# Patient Record
Sex: MALE | URBAN - METROPOLITAN AREA
[De-identification: names, ages, dates, MRNs, and addresses within clinical notes are randomized per-mention and may not be internally consistent; named-entity substitution may affect disease eponyms.]

---

## 2021-12-14 ENCOUNTER — OFFICE VISIT (OUTPATIENT)
Dept: URBAN - METROPOLITAN AREA CLINIC 98 | Facility: CLINIC | Age: 41
End: 2021-12-14
Payer: SELF-PAY

## 2021-12-14 DIAGNOSIS — R13.14 PHARYNGOESOPHAGEAL DYSPHAGIA: ICD-10-CM

## 2021-12-14 DIAGNOSIS — K21.9 GERD WITHOUT ESOPHAGITIS: ICD-10-CM

## 2021-12-14 DIAGNOSIS — E04.9 GOITER: ICD-10-CM

## 2021-12-14 PROBLEM — 40739000 DYSPHAGIA: Status: ACTIVE | Noted: 2021-12-14

## 2021-12-14 PROCEDURE — 99243 OFF/OP CNSLTJ NEW/EST LOW 30: CPT | Performed by: INTERNAL MEDICINE

## 2021-12-14 RX ORDER — PANTOPRAZOLE SODIUM 40 MG/1
1 PACKET MIXED WITH APPLE JUICE OR APPLESAUCE GRANULE, DELAYED RELEASE ORAL ONCE A DAY
Qty: 20 | Refills: 1 | OUTPATIENT
Start: 2021-12-14

## 2021-12-14 NOTE — HPI-TODAY'S VISIT:
Patient referred by Oliver Hackett MD for evaluation of chronic GERD and intermittent dysphagia wo food impaction. Copy of this consult OV sent to Dr. Hackett. 42 yo pt w several weeks of intermittent dysphagia to solids wo food impaction w nocturnal APRIL sxs. Has been on a bland diet and has lost some weight. NO cardiorespiratory sxs. Has noted intermittent rectal bleeding wo melenic stools. He has  no constitutional sxs.  He was seen  at [facility]  [facility] and Dx'd w tonsilliitis: Prednisone / Amoxicillin. No improvement and seen few days later at [facility] ER: Dx' w GERD and treated w Pantoprazole. Seen by ENT: LPR w normal rhinoscopy. Had mild COVID-19 and has received 2 doses of COVID-19 vaccine. No other complaints.

## 2021-12-28 ENCOUNTER — TELEPHONE ENCOUNTER (OUTPATIENT)
Dept: URBAN - METROPOLITAN AREA CLINIC 98 | Facility: CLINIC | Age: 41
End: 2021-12-28

## 2021-12-28 ENCOUNTER — OFFICE VISIT (OUTPATIENT)
Dept: URBAN - METROPOLITAN AREA CLINIC 97 | Facility: CLINIC | Age: 41
End: 2021-12-28

## 2021-12-28 RX ORDER — PANTOPRAZOLE SODIUM 40 MG/1
1 TABLET TABLET, DELAYED RELEASE ORAL ONCE A DAY
Qty: 30 TABLET | Refills: 1
Start: 2021-12-14

## 2022-01-04 ENCOUNTER — TELEPHONE ENCOUNTER (OUTPATIENT)
Dept: URBAN - METROPOLITAN AREA CLINIC 98 | Facility: CLINIC | Age: 42
End: 2022-01-04

## 2022-01-04 RX ORDER — PANTOPRAZOLE SODIUM 40 MG/1
1 PACKET MIXED WITH APPLE JUICE OR APPLESAUCE GRANULE, DELAYED RELEASE ORAL ONCE A DAY
Qty: 90 | Refills: 3 | OUTPATIENT
Start: 2022-01-05

## 2022-01-05 PROBLEM — 40739000: Status: ACTIVE | Noted: 2021-12-14

## 2022-01-11 ENCOUNTER — TELEPHONE ENCOUNTER (OUTPATIENT)
Dept: URBAN - METROPOLITAN AREA SURGERY CENTER 30 | Facility: SURGERY CENTER | Age: 42
End: 2022-01-11

## 2022-01-11 ENCOUNTER — OFFICE VISIT (OUTPATIENT)
Dept: URBAN - METROPOLITAN AREA CLINIC 97 | Facility: CLINIC | Age: 42
End: 2022-01-11

## 2022-01-20 ENCOUNTER — OFFICE VISIT (OUTPATIENT)
Dept: URBAN - METROPOLITAN AREA SURGERY CENTER 18 | Facility: SURGERY CENTER | Age: 42
End: 2022-01-20
Payer: SELF-PAY

## 2022-01-20 ENCOUNTER — CLAIMS CREATED FROM THE CLAIM WINDOW (OUTPATIENT)
Dept: URBAN - METROPOLITAN AREA CLINIC 4 | Facility: CLINIC | Age: 42
End: 2022-01-20
Payer: SELF-PAY

## 2022-01-20 DIAGNOSIS — K29.60 ADENOPAPILLOMATOSIS GASTRICA: ICD-10-CM

## 2022-01-20 DIAGNOSIS — K29.60 OTHER GASTRITIS WITHOUT BLEEDING: ICD-10-CM

## 2022-01-20 DIAGNOSIS — K21.9 GASTRO-ESOPHAGEAL REFLUX DISEASE WITHOUT ESOPHAGITIS: ICD-10-CM

## 2022-01-20 DIAGNOSIS — K31.89 DUODENAL ERYTHEMA: ICD-10-CM

## 2022-01-20 DIAGNOSIS — B96.81 HELICOBACTER PYLORI [H. PYLORI] AS THE CAUSE OF DISEASES CLASSIFIED ELSEWHERE: ICD-10-CM

## 2022-01-20 DIAGNOSIS — B96.81 BACTERIAL INFECTION DUE TO H. PYLORI: ICD-10-CM

## 2022-01-20 DIAGNOSIS — K21.9 ACID REFLUX: ICD-10-CM

## 2022-01-20 PROCEDURE — 43239 EGD BIOPSY SINGLE/MULTIPLE: CPT | Performed by: INTERNAL MEDICINE

## 2022-01-20 PROCEDURE — G8907 PT DOC NO EVENTS ON DISCHARG: HCPCS | Performed by: INTERNAL MEDICINE

## 2022-01-20 PROCEDURE — 88305 TISSUE EXAM BY PATHOLOGIST: CPT | Performed by: PATHOLOGY

## 2022-01-20 PROCEDURE — 88312 SPECIAL STAINS GROUP 1: CPT | Performed by: PATHOLOGY

## 2022-01-20 RX ORDER — PANTOPRAZOLE SODIUM 40 MG/1
1 PACKET MIXED WITH APPLE JUICE OR APPLESAUCE GRANULE, DELAYED RELEASE ORAL ONCE A DAY
Qty: 90 | Refills: 3 | Status: ACTIVE | COMMUNITY
Start: 2022-01-05

## 2022-01-20 RX ORDER — PANTOPRAZOLE SODIUM 40 MG/1
1 TABLET TABLET, DELAYED RELEASE ORAL ONCE A DAY
Qty: 30 TABLET | Refills: 1 | Status: ACTIVE | COMMUNITY
Start: 2021-12-14

## 2022-01-24 ENCOUNTER — TELEPHONE ENCOUNTER (OUTPATIENT)
Dept: URBAN - METROPOLITAN AREA CLINIC 92 | Facility: CLINIC | Age: 42
End: 2022-01-24

## 2022-01-24 RX ORDER — AMOXICILLIN 500 MG/1
2 CAPSULES CAPSULE ORAL
Qty: 56 CAPSULE | Refills: 0 | OUTPATIENT
Start: 2022-01-24 | End: 2022-02-07

## 2022-01-24 RX ORDER — OMEPRAZOLE 20 MG/1
1 CAPSULE 30 MINUTES BEFORE MORNING MEAL CAPSULE, DELAYED RELEASE ORAL TWICE A DAY
Qty: 28 | Refills: 0 | OUTPATIENT
Start: 2022-01-24

## 2022-01-24 RX ORDER — METRONIDAZOLE 500 MG/1
1 TABLET TABLET ORAL TWICE A DAY
Qty: 28 TABLET | Refills: 0 | OUTPATIENT
Start: 2022-01-24 | End: 2022-02-07

## 2022-01-24 RX ORDER — CLARITHROMYCIN 500 MG/1
1 TABLET TABLET, FILM COATED ORAL
Qty: 28 TABLET | Refills: 0 | OUTPATIENT
Start: 2022-01-24 | End: 2022-02-07

## 2022-02-01 ENCOUNTER — TELEPHONE ENCOUNTER (OUTPATIENT)
Dept: URBAN - METROPOLITAN AREA CLINIC 98 | Facility: CLINIC | Age: 42
End: 2022-02-01

## 2022-02-02 ENCOUNTER — TELEPHONE ENCOUNTER (OUTPATIENT)
Dept: URBAN - METROPOLITAN AREA CLINIC 98 | Facility: CLINIC | Age: 42
End: 2022-02-02

## 2022-03-02 ENCOUNTER — TELEPHONE ENCOUNTER (OUTPATIENT)
Dept: URBAN - METROPOLITAN AREA CLINIC 98 | Facility: CLINIC | Age: 42
End: 2022-03-02

## 2022-03-15 ENCOUNTER — OFFICE VISIT (OUTPATIENT)
Dept: URBAN - METROPOLITAN AREA CLINIC 98 | Facility: CLINIC | Age: 42
End: 2022-03-15
Payer: SELF-PAY

## 2022-03-15 ENCOUNTER — LAB OUTSIDE AN ENCOUNTER (OUTPATIENT)
Dept: URBAN - METROPOLITAN AREA CLINIC 98 | Facility: CLINIC | Age: 42
End: 2022-03-15

## 2022-03-15 VITALS
TEMPERATURE: 97.2 F | DIASTOLIC BLOOD PRESSURE: 71 MMHG | BODY MASS INDEX: 32.4 KG/M2 | HEART RATE: 72 BPM | WEIGHT: 189.8 LBS | HEIGHT: 64 IN | SYSTOLIC BLOOD PRESSURE: 112 MMHG

## 2022-03-15 DIAGNOSIS — R13.12 OROPHARYNGEAL DYSPHAGIA: ICD-10-CM

## 2022-03-15 DIAGNOSIS — K29.70 GASTRITIS, UNSPECIFIED, WITHOUT BLEEDING: ICD-10-CM

## 2022-03-15 DIAGNOSIS — B96.81 HELICOBACTER PYLORI [H. PYLORI] AS THE CAUSE OF DISEASES CLASSIFIED ELSEWHERE: ICD-10-CM

## 2022-03-15 DIAGNOSIS — E66.9 OBESITY (BMI 30.0-34.9): ICD-10-CM

## 2022-03-15 DIAGNOSIS — E04.9 GOITER: ICD-10-CM

## 2022-03-15 DIAGNOSIS — K21.9 GERD WITHOUT ESOPHAGITIS: ICD-10-CM

## 2022-03-15 PROCEDURE — 83014 H PYLORI DRUG ADMIN: CPT | Performed by: INTERNAL MEDICINE

## 2022-03-15 PROCEDURE — 99214 OFFICE O/P EST MOD 30 MIN: CPT | Performed by: INTERNAL MEDICINE

## 2022-03-15 PROCEDURE — 83013 H PYLORI (C-13) BREATH: CPT | Performed by: INTERNAL MEDICINE

## 2022-03-15 RX ORDER — PANTOPRAZOLE SODIUM 40 MG/1
1 PACKET MIXED WITH APPLE JUICE OR APPLESAUCE GRANULE, DELAYED RELEASE ORAL ONCE A DAY
Qty: 90 | Refills: 3 | Status: ON HOLD | COMMUNITY
Start: 2022-01-05

## 2022-03-15 RX ORDER — PANTOPRAZOLE SODIUM 40 MG/1
1 TABLET TABLET, DELAYED RELEASE ORAL ONCE A DAY
Qty: 30 TABLET | Refills: 1 | Status: ON HOLD | COMMUNITY
Start: 2021-12-14

## 2022-03-15 RX ORDER — OMEPRAZOLE 20 MG/1
1 CAPSULE 30 MINUTES BEFORE MORNING MEAL CAPSULE, DELAYED RELEASE ORAL TWICE A DAY
Qty: 28 | Refills: 0 | Status: ON HOLD | COMMUNITY
Start: 2022-01-24

## 2022-03-15 NOTE — HPI-TODAY'S VISIT:
42 yo pt w several weeks of intermittent dysphagia to solids wo food impaction w nocturnal APRIL sxs. Has been on a bland diet and has lost some weight. NO cardiorespiratory sxs. EGD 1/22: GERD wo BE, sm HH, Hp + chrojic active gastritis wo dysplasia and normal duodenal bx's.  He finished [facility] a mo ago, and currently on no PPI. Today, he still c/o oropharyngeal dysphagia that occurs  intermittently: difficulty in initiating a swallow at times, some nasopharyngeal regurgitation and sensation of residual food remaining in the pharynx. Has been on a bland diet. Thyroid US: goiter wo nodules; seen by Endocrine and Dx'd w Hashimoto's on Levothyroxine for past 2 mos. Recent Ba Swallow: spontaneous APRIL to the upper thoracic esophagus.He has  no constitutional sxs.   Seen by ENT: LPR w normal rhinoscopy. Had mild COVID-19 and has received 2 doses of COVID-19 vaccine. No other complaints.

## 2022-03-17 LAB
H PYLORI BREATH TEST: NEGATIVE
H. PYLORI BREATH COLLECTION: (no result)

## 2022-04-18 ENCOUNTER — TELEPHONE ENCOUNTER (OUTPATIENT)
Dept: URBAN - METROPOLITAN AREA CLINIC 98 | Facility: CLINIC | Age: 42
End: 2022-04-18

## 2022-04-18 RX ORDER — PANTOPRAZOLE SODIUM 40 MG/1
1 TABLET TABLET, DELAYED RELEASE ORAL ONCE A DAY
Qty: 30 | Refills: 3 | OUTPATIENT
Start: 2022-04-18

## 2022-05-12 ENCOUNTER — ERX REFILL RESPONSE (OUTPATIENT)
Dept: URBAN - METROPOLITAN AREA CLINIC 98 | Facility: CLINIC | Age: 42
End: 2022-05-12

## 2022-05-12 RX ORDER — PANTOPRAZOLE SODIUM 40 MG/1
1 TABLET TABLET, DELAYED RELEASE ORAL ONCE A DAY
Qty: 30 | Refills: 3 | OUTPATIENT

## 2022-05-12 RX ORDER — PANTOPRAZOLE SODIUM 40 MG/1
TAKE 1 TABLET BY MOUTH EVERY DAY FOR 30 DAYS TABLET, DELAYED RELEASE ORAL
Qty: 30 TABLET | Refills: 4 | OUTPATIENT

## 2022-06-07 ENCOUNTER — OFFICE VISIT (OUTPATIENT)
Dept: URBAN - METROPOLITAN AREA MEDICAL CENTER 28 | Facility: MEDICAL CENTER | Age: 42
End: 2022-06-07

## 2022-06-07 RX ORDER — PANTOPRAZOLE SODIUM 40 MG/1
1 PACKET MIXED WITH APPLE JUICE OR APPLESAUCE GRANULE, DELAYED RELEASE ORAL ONCE A DAY
Qty: 90 | Refills: 3 | Status: ON HOLD | COMMUNITY
Start: 2022-01-05

## 2022-06-07 RX ORDER — OMEPRAZOLE 20 MG/1
1 CAPSULE 30 MINUTES BEFORE MORNING MEAL CAPSULE, DELAYED RELEASE ORAL TWICE A DAY
Qty: 28 | Refills: 0 | Status: ON HOLD | COMMUNITY
Start: 2022-01-24

## 2022-06-07 RX ORDER — PANTOPRAZOLE SODIUM 40 MG/1
TAKE 1 TABLET BY MOUTH EVERY DAY FOR 30 DAYS TABLET, DELAYED RELEASE ORAL
Qty: 30 TABLET | Refills: 4 | Status: ACTIVE | COMMUNITY

## 2022-08-19 ENCOUNTER — ERX REFILL RESPONSE (OUTPATIENT)
Dept: URBAN - METROPOLITAN AREA CLINIC 98 | Facility: CLINIC | Age: 42
End: 2022-08-19

## 2022-08-19 RX ORDER — PANTOPRAZOLE SODIUM 40 MG/1
TAKE 1 TABLET BY MOUTH EVERY DAY TABLET, DELAYED RELEASE ORAL
Qty: 90 TABLET | Refills: 2 | OUTPATIENT

## 2022-08-19 RX ORDER — PANTOPRAZOLE SODIUM 40 MG/1
TAKE 1 TABLET BY MOUTH EVERY DAY TABLET, DELAYED RELEASE ORAL
Qty: 90 TABLET | Refills: 1 | OUTPATIENT

## 2022-11-07 ENCOUNTER — TELEPHONE ENCOUNTER (OUTPATIENT)
Dept: URBAN - METROPOLITAN AREA CLINIC 98 | Facility: CLINIC | Age: 42
End: 2022-11-07

## 2022-11-08 ENCOUNTER — OFFICE VISIT (OUTPATIENT)
Dept: URBAN - METROPOLITAN AREA MEDICAL CENTER 28 | Facility: MEDICAL CENTER | Age: 42
End: 2022-11-08
Payer: SELF-PAY

## 2022-11-08 DIAGNOSIS — R63.4 ABNORMAL INTENTIONAL WEIGHT LOSS: ICD-10-CM

## 2022-11-08 DIAGNOSIS — R13.19 CERVICAL DYSPHAGIA: ICD-10-CM

## 2022-11-08 PROCEDURE — 91038 ESOPH IMPED FUNCT TEST > 1HR: CPT | Performed by: INTERNAL MEDICINE

## 2023-01-10 ENCOUNTER — OFFICE VISIT (OUTPATIENT)
Dept: URBAN - METROPOLITAN AREA TELEHEALTH 2 | Facility: TELEHEALTH | Age: 43
End: 2023-01-10
Payer: SELF-PAY

## 2023-01-10 ENCOUNTER — DASHBOARD ENCOUNTERS (OUTPATIENT)
Age: 43
End: 2023-01-10

## 2023-01-10 DIAGNOSIS — K29.70 GASTRITIS, UNSPECIFIED, WITHOUT BLEEDING: ICD-10-CM

## 2023-01-10 DIAGNOSIS — E04.9 GOITER: ICD-10-CM

## 2023-01-10 DIAGNOSIS — K21.9 GERD WITHOUT ESOPHAGITIS: ICD-10-CM

## 2023-01-10 DIAGNOSIS — E66.9 OBESITY (BMI 30.0-34.9): ICD-10-CM

## 2023-01-10 DIAGNOSIS — B96.81 HELICOBACTER PYLORI [H. PYLORI] AS THE CAUSE OF DISEASES CLASSIFIED ELSEWHERE: ICD-10-CM

## 2023-01-10 PROCEDURE — 99214 OFFICE O/P EST MOD 30 MIN: CPT | Performed by: INTERNAL MEDICINE

## 2023-01-10 RX ORDER — OMEPRAZOLE 20 MG/1
1 CAPSULE 30 MINUTES BEFORE MORNING MEAL CAPSULE, DELAYED RELEASE ORAL TWICE A DAY
Qty: 28 | Refills: 0 | Status: ON HOLD | COMMUNITY
Start: 2022-01-24

## 2023-01-10 RX ORDER — PANTOPRAZOLE SODIUM 40 MG/1
TAKE 1 TABLET BY MOUTH EVERY DAY TABLET, DELAYED RELEASE ORAL
Qty: 90 TABLET | Refills: 1 | Status: ACTIVE | COMMUNITY

## 2023-01-10 RX ORDER — PANTOPRAZOLE SODIUM 40 MG/1
1 PACKET MIXED WITH APPLE JUICE OR APPLESAUCE GRANULE, DELAYED RELEASE ORAL ONCE A DAY
Qty: 90 | Refills: 3 | Status: ON HOLD | COMMUNITY
Start: 2022-01-05

## 2023-01-11 PROBLEM — 266435005: Status: ACTIVE | Noted: 2021-12-14

## 2023-01-11 PROBLEM — 443371000124107: Status: ACTIVE | Noted: 2022-03-15

## 2023-01-11 PROBLEM — 71457002: Status: ACTIVE | Noted: 2022-02-07

## 2023-01-11 PROBLEM — 3716002: Status: ACTIVE | Noted: 2021-12-14

## 2023-01-11 PROBLEM — 708164002: Status: ACTIVE | Noted: 2022-03-15

## 2023-02-26 ENCOUNTER — ERX REFILL RESPONSE (OUTPATIENT)
Dept: URBAN - METROPOLITAN AREA CLINIC 98 | Facility: CLINIC | Age: 43
End: 2023-02-26

## 2023-02-26 RX ORDER — PANTOPRAZOLE SODIUM 40 MG/1
TAKE 1 TABLET BY MOUTH EVERY DAY TABLET, DELAYED RELEASE ORAL
Qty: 90 TABLET | Refills: 1 | OUTPATIENT

## 2025-04-30 ENCOUNTER — LAB OUTSIDE AN ENCOUNTER (OUTPATIENT)
Dept: URBAN - METROPOLITAN AREA CLINIC 98 | Facility: CLINIC | Age: 45
End: 2025-04-30

## 2025-04-30 ENCOUNTER — OFFICE VISIT (OUTPATIENT)
Dept: URBAN - METROPOLITAN AREA CLINIC 98 | Facility: CLINIC | Age: 45
End: 2025-04-30

## 2025-04-30 RX ORDER — PANTOPRAZOLE SODIUM 40 MG/1
TAKE 1 TABLET BY MOUTH EVERY DAY TABLET, DELAYED RELEASE ORAL
Qty: 90 TABLET | Refills: 1 | Status: ON HOLD | COMMUNITY

## 2025-04-30 RX ORDER — OMEPRAZOLE 20 MG/1
1 CAPSULE 30 MINUTES BEFORE MORNING MEAL CAPSULE, DELAYED RELEASE ORAL TWICE A DAY
Qty: 28 | Refills: 0 | Status: ON HOLD | COMMUNITY
Start: 2022-01-24

## 2025-04-30 RX ORDER — SODIUM, POTASSIUM,MAG SULFATES 17.5-3.13G
354ML SOLUTION, RECONSTITUTED, ORAL ORAL
Qty: 345 MILLILITER | Refills: 0 | OUTPATIENT
Start: 2025-04-30 | End: 2025-05-01

## 2025-04-30 RX ORDER — PANTOPRAZOLE SODIUM 40 MG/1
1 PACKET MIXED WITH APPLE JUICE OR APPLESAUCE GRANULE, DELAYED RELEASE ORAL ONCE A DAY
Qty: 90 | Refills: 3 | Status: ON HOLD | COMMUNITY
Start: 2022-01-05

## 2025-06-30 ENCOUNTER — OFFICE VISIT (OUTPATIENT)
Dept: URBAN - METROPOLITAN AREA SURGERY CENTER 18 | Facility: SURGERY CENTER | Age: 45
End: 2025-06-30